# Patient Record
Sex: FEMALE | Race: WHITE | NOT HISPANIC OR LATINO | Employment: FULL TIME | URBAN - METROPOLITAN AREA
[De-identification: names, ages, dates, MRNs, and addresses within clinical notes are randomized per-mention and may not be internally consistent; named-entity substitution may affect disease eponyms.]

---

## 2023-08-21 ENCOUNTER — OFFICE VISIT (OUTPATIENT)
Dept: PRIMARY CARE | Facility: CLINIC | Age: 38
End: 2023-08-21
Payer: MEDICARE

## 2023-08-21 VITALS
RESPIRATION RATE: 16 BRPM | HEIGHT: 62 IN | DIASTOLIC BLOOD PRESSURE: 78 MMHG | BODY MASS INDEX: 23 KG/M2 | TEMPERATURE: 98 F | WEIGHT: 125 LBS | HEART RATE: 82 BPM | OXYGEN SATURATION: 97 % | SYSTOLIC BLOOD PRESSURE: 114 MMHG

## 2023-08-21 DIAGNOSIS — R20.2 NUMBNESS AND TINGLING: Primary | ICD-10-CM

## 2023-08-21 DIAGNOSIS — R20.0 NUMBNESS AND TINGLING: Primary | ICD-10-CM

## 2023-08-21 DIAGNOSIS — G62.89 OTHER POLYNEUROPATHY: ICD-10-CM

## 2023-08-21 PROBLEM — F90.9 ADHD (ATTENTION DEFICIT HYPERACTIVITY DISORDER): Status: ACTIVE | Noted: 2023-08-21

## 2023-08-21 PROBLEM — A60.00 GENITAL HERPES: Status: ACTIVE | Noted: 2023-08-21

## 2023-08-21 PROBLEM — F41.9 ANXIETY: Status: ACTIVE | Noted: 2023-08-21

## 2023-08-21 PROBLEM — F32.A DEPRESSION: Status: ACTIVE | Noted: 2023-08-21

## 2023-08-21 PROBLEM — G62.9 PERIPHERAL NEUROPATHY: Status: ACTIVE | Noted: 2023-08-21

## 2023-08-21 PROBLEM — Q20.3: Status: ACTIVE | Noted: 2023-08-21

## 2023-08-21 PROCEDURE — 1036F TOBACCO NON-USER: CPT | Performed by: FAMILY MEDICINE

## 2023-08-21 PROCEDURE — 99203 OFFICE O/P NEW LOW 30 MIN: CPT | Performed by: FAMILY MEDICINE

## 2023-08-21 RX ORDER — ESCITALOPRAM OXALATE 10 MG/1
TABLET, FILM COATED ORAL EVERY 24 HOURS
COMMUNITY
Start: 2022-06-01 | End: 2024-06-05 | Stop reason: SDUPTHER

## 2023-08-21 RX ORDER — ESCITALOPRAM OXALATE 20 MG/1
TABLET, FILM COATED ORAL EVERY 24 HOURS
COMMUNITY
Start: 2022-02-21 | End: 2024-02-22 | Stop reason: SDUPTHER

## 2023-08-21 RX ORDER — VALACYCLOVIR HYDROCHLORIDE 500 MG/1
500 TABLET, FILM COATED ORAL DAILY
COMMUNITY
End: 2023-10-02 | Stop reason: SDUPTHER

## 2023-08-21 RX ORDER — IBUPROFEN 100 MG/5ML
SUSPENSION, ORAL (FINAL DOSE FORM) ORAL
COMMUNITY

## 2023-08-21 RX ORDER — METHYLPHENIDATE HYDROCHLORIDE 18 MG/1
18 TABLET ORAL
COMMUNITY
End: 2023-10-26 | Stop reason: SDUPTHER

## 2023-08-21 RX ORDER — BUPROPION HYDROCHLORIDE 150 MG/1
150 TABLET ORAL
COMMUNITY
End: 2023-11-14 | Stop reason: SDUPTHER

## 2023-08-21 RX ORDER — VITAMIN E (DL,TOCOPHERYL ACET) 180 MG
CAPSULE ORAL
COMMUNITY

## 2023-08-21 RX ORDER — GLUCOSAMINE SULFATE 500 MG
TABLET ORAL EVERY 24 HOURS
COMMUNITY
Start: 2021-09-28 | End: 2024-06-05 | Stop reason: ALTCHOICE

## 2023-08-21 RX ORDER — FERROUS SULFATE 325(65) MG
TABLET ORAL
COMMUNITY
Start: 2021-09-28

## 2023-08-21 RX ORDER — ZINC GLUCONATE 50 MG
TABLET ORAL
COMMUNITY
End: 2024-06-05 | Stop reason: ALTCHOICE

## 2023-08-21 ASSESSMENT — ENCOUNTER SYMPTOMS: NUMBNESS: 1

## 2023-08-21 NOTE — PROGRESS NOTES
"Subjective   Patient ID: Angelica Matson is a 38 y.o. female who presents for Numbness (Establish care - hands and feet go numb on random occasions. This has been going on for about 4 years and she has not received a clear definitive diagnosis. ).    For several years she has numbness in the let forearm. It can also be in the right.  It will be cold & tingly.  She could be out walking/running.  It also may happen to her lower extremities.  Just the feet.  It doesn't matter if she is moving around or not.  She suddenly loses the ability to hold weights at the gym.  She has numbness and so she suddenly feels weak.    She has a pcp who she has been seeing and has had a lot of labs done to check this.      She states that she had thyroid tests done, lipids, cmp, and cbc and all was normal.      Neuropathy           Review of Systems   Neurological:  Positive for numbness.       Objective   /78   Pulse 82   Temp 36.7 °C (98 °F)   Resp 16   Ht 1.575 m (5' 2\")   Wt 56.7 kg (125 lb)   SpO2 97%   BMI 22.86 kg/m²     Physical Exam  Constitutional:       Appearance: Normal appearance.   HENT:      Head: Normocephalic and atraumatic.      Mouth/Throat:      Mouth: Mucous membranes are dry.   Cardiovascular:      Rate and Rhythm: Normal rate.   Pulmonary:      Effort: Pulmonary effort is normal. No respiratory distress.   Musculoskeletal:         General: No swelling, tenderness or signs of injury.      Cervical back: Normal range of motion. No rigidity.   Skin:     General: Skin is warm and dry.      Capillary Refill: Capillary refill takes less than 2 seconds.   Neurological:      General: No focal deficit present.      Mental Status: She is alert and oriented to person, place, and time.      Cranial Nerves: No cranial nerve deficit.      Motor: No weakness.      Coordination: Coordination normal.      Gait: Gait normal.      Deep Tendon Reflexes: Reflexes normal.         Assessment/Plan   Diagnoses and all orders " for this visit:  Numbness and tingling  -     EMG & nerve conduction; Future  -     MR brain w and wo IV contrast; Future  -     Referral to Neurology; Future  Other polyneuropathy  -     Referral to Neurology; Future

## 2023-08-21 NOTE — PATIENT INSTRUCTIONS
Welcome to our practice!    I would advise you to please decide on a pcp for your medical care going forward    Today we have evaluated your neuropathy and I have ordered emg/ncs and brain mri.     I have also referred you to neurologist.      Follow up when results received.

## 2023-08-31 DIAGNOSIS — Z87.74 H/O SENNING PROCEDURE: Primary | ICD-10-CM

## 2023-08-31 PROBLEM — I07.1 TRICUSPID VALVE REGURGITATION: Status: ACTIVE | Noted: 2023-08-31

## 2023-08-31 PROBLEM — Q20.3 TRANSPOSITION OF GREAT ARTERIES (HHS-HCC): Status: ACTIVE | Noted: 2023-08-31

## 2023-08-31 PROBLEM — I10 HTN (HYPERTENSION): Status: ACTIVE | Noted: 2023-08-31

## 2023-08-31 PROBLEM — I35.1 MILD AORTIC INSUFFICIENCY: Status: ACTIVE | Noted: 2023-08-31

## 2023-08-31 PROBLEM — Q23.1: Status: ACTIVE | Noted: 2023-08-31

## 2023-10-02 DIAGNOSIS — A60.00 GENITAL HERPES SIMPLEX, UNSPECIFIED SITE: ICD-10-CM

## 2023-10-08 RX ORDER — VALACYCLOVIR HYDROCHLORIDE 500 MG/1
500 TABLET, FILM COATED ORAL DAILY
Qty: 90 TABLET | Refills: 3 | Status: SHIPPED | OUTPATIENT
Start: 2023-10-08

## 2023-10-25 NOTE — PROGRESS NOTES
Patient Name: Angelica Matson   Provider: Juwan Jsoe MD  : 1985  Date of Service: 2023  Referring: No ref. provider found    DIAGNOSES:     1.DTGA  - Atrial switch (Senning) age 10 mos ([facility])  - TTE 23  2.Tricuspid Valve Regurgitation (Systemic AV valve)  - TTE 12/15/2020 Moderate to severe TR 2-3+  - TTE 2021 Mild TR  - TTE 10/18/2022 Mild TR  - TTE 23  3. Aortic Insufficiency  - TTE 2021 mild  - TTE 10/18/2022 mild  - TTE 23    INTERVAL HISTORY:     Dear  No ref. provider found    I saw Angelica Matson today in the [facility], [facility] and [facility] and Vascular [place] at [facility] multispecialty clinic.  She is a 39 yo with the above diagnoses.  Last seen by me 10/18/22.  At that visit she had no complaints and was an active runner.  Her echo showed preserved systemic RV function and mild TR (systemic AV valve).  She is on no caardiac meds.    Today she reports:    ROS:   General: no fatigue, no fever, no weight loss, no excessive sweating, no decreased appetite  HEENT: no facial swelling, no hoarseness, no eye redness, no eye lid swelling  Cardiac: no fainting, no cyanosis, no chest pain, no palpitations  Respiratory: no shortness of breath, no coughing blood, no noisy breathing, no wheezing, no cough  GI: No abdomen pain, no constipation, no diarrhea, no vomiting  Musculoskeletal: no extremity swelling  Skin: no paleness, no rash, no yellow skin  Hematologic: no easy bruising, no easy bleeding  Neurologic: no seizures, no weakness    All systems negative unless otherwise stated.    CURRENT MEDS:    Current Outpatient Medications:     ascorbic acid (Vitamin C) 1,000 mg tablet, Take by mouth., Disp: , Rfl:     buPROPion XL (Wellbutrin XL) 150 mg 24 hr tablet, Take 1 tablet (150 mg) by mouth once daily in the morning. Take before meals., Disp: , Rfl:     ferrous  sulfate 325 (65 Fe) MG tablet, Take by mouth., Disp: , Rfl:     glucosam/chondro/herb 149/hyal (GLUCOS CHOND CPLX ADVANCED ORAL), once every 24 hours., Disp: , Rfl:     levonorgestrel (MIRENA UTRN), as directed Intrauterine, Disp: , Rfl:     Lexapro 10 mg tablet, once every 24 hours., Disp: , Rfl:     Lexapro 20 mg tablet, once every 24 hours., Disp: , Rfl:     lysine 1,000 mg tablet, once every 24 hours., Disp: , Rfl:     methylphenidate CR (Concerta) 18 mg daily tablet, Take 1 tablet (18 mg) by mouth once daily in the morning. Take before meals., Disp: , Rfl:     multivit-min-folic acid-biotin (Hair,Skin and Nails,FA-biotin,) 133.3 mcg- 1,666.7 mcg capsule, Take by mouth., Disp: , Rfl:     valACYclovir (Valtrex) 500 mg tablet, Take 1 tablet (500 mg) by mouth once daily., Disp: 90 tablet, Rfl: 3    zinc gluconate 50 mg tablet, Take by mouth., Disp: , Rfl:     PHYSICAL EXAM:  There were no vitals taken for this visit.  There is no height or weight on file to calculate BMI.    General: Well appearing and in no distress  HEENT: Moist mucous membranes  Neck: Supple, JVP normal, Carotid upstrokes brisk bilaterally and without bruits  Respiratory: Clear to ausculation bilaterally; no wheezing/rales/rhonchi; respirations non-labored  Cardiovascular: RRR,normal PMI, normal S1 and S2. No S3 or S4. No murmurs or rubs.   Gastrointestinal: soft, non-tender, no organomegaly, no abnormal aortic pulsation  Extremities: warm and well perfused with no cyanosis, clubbing, or edema  Neurologic: awake/alert, no focal deficits    DATA:    TTE 11/1/23 personally reviewed by in clinic today:    Echo 10/18/2022 images reviewed and interpreted by Juwan Jose MD  D TGA s/p Senning Procedure  IVC is non dilated and collapses with inspiration  Mild AI  Mild TR  No MR  Preserved Systemic RV function  No baffle obstruction      TTE 9/28/2021:Images reviewed and interpreted by Juwan Jose MD   Aorta anterior and to the right of MPA- which  "confirms d-TGA  Phasic flow in atrial baffle   Small and D shaped LV  RV hypertrophied with preserved function  Mild TR  Trivial AI    WBC   Date Value Ref Range Status   07/31/2023 3.9 (L) 4.5 - 11.0 K/UL Final     Hemoglobin   Date Value Ref Range Status   07/31/2023 16.3 (H) 12.0 - 15.0 GM/DL Final     MCV   Date Value Ref Range Status   07/31/2023 94.7 80 - 100 FL Final     Creatinine   Date Value Ref Range Status   07/31/2023 0.8 0.4 - 1.6 MG/DL Final   01/31/2022 0.7 0.4 - 1.6 MG/DL Final       No components found for: \"MAGNESIUM\"  Total Protein   Date Value Ref Range Status   07/31/2023 7.2 5.9 - 7.9 G/DL Final     Albumin   Date Value Ref Range Status   07/31/2023 4.8 3.5 - 5.0 GM/DL Final     ALT (SGPT)   Date Value Ref Range Status   07/31/2023 38 5 - 40 U/L Final     AST   Date Value Ref Range Status   07/31/2023 35 5 - 40 U/L Final     No results found for: \"INR\", \"APTT\"      ASSESSMENT & PLAN:      39 y/o d-TGA s/p atrial switch doing well.     1. DTGA + H/O Senning procedure  s/p atrial switch operation (Senning)    Echo today shows:    F/u echo (annual) one year to assess systemic RV function, baffle, and AV valve regurgitation    SBE prophylaxis NOT indicated at this time    2. Tricuspid Valve Regurgitation    TTE today shows:    Will repeat Echo in one year for surveillance on severity    3. Aortic Insufficiency    TTE today shows:     Follow up 1 year with echo    4. HTN  SPB 130s to 150s at each visit over the last 2 years  BP log with SBPs ranging 115-130s    Now on Concerta for ADHD - this may potentiate HTN. Given that she has a systemic RV, excellent BP control is paramount to prevent slow RV reemodeling and ultimately decline in performance.   Will have patient continue to log BPs daily. If now hypertensive, need to consider other ADHD options or start BP med(s)    Thank you for allowing me to participate in the care of this patient.  Please don't hesitate to contact us with any questions or " concerns.    Sincerely,    Juwan Jose MD, MSc  Director, Adult Congenital Heart Disease Program  [facility] Heart and Vascular [place]

## 2023-10-26 DIAGNOSIS — F90.9 ATTENTION DEFICIT HYPERACTIVITY DISORDER (ADHD), UNSPECIFIED ADHD TYPE: ICD-10-CM

## 2023-10-27 RX ORDER — METHYLPHENIDATE HYDROCHLORIDE 18 MG/1
18 TABLET ORAL
Qty: 30 TABLET | Refills: 0 | Status: SHIPPED | OUTPATIENT
Start: 2023-10-27 | End: 2023-11-01

## 2023-11-01 ENCOUNTER — APPOINTMENT (OUTPATIENT)
Dept: PEDIATRIC CARDIOLOGY | Facility: HOSPITAL | Age: 38
End: 2023-11-01
Payer: MEDICARE

## 2023-11-01 DIAGNOSIS — F41.9 ANXIETY: ICD-10-CM

## 2023-11-01 RX ORDER — METHYLPHENIDATE HYDROCHLORIDE 18 MG/1
18 TABLET ORAL EVERY MORNING
COMMUNITY
End: 2023-11-01

## 2023-11-07 RX ORDER — METHYLPHENIDATE HYDROCHLORIDE 18 MG/1
18 TABLET ORAL EVERY MORNING
Qty: 90 TABLET | Refills: 0 | Status: SHIPPED | OUTPATIENT
Start: 2023-11-07 | End: 2023-11-14 | Stop reason: SDUPTHER

## 2023-11-08 NOTE — PROGRESS NOTES
Patient Name: Angelica Matson   Provider: Juwan Jose MD  : 1985  Date of Service: 11/15/2023  Referring: No ref. provider found    DIAGNOSES:     1.DTGA  - Atrial switch (Senning) age 10 mos ([facility])  - TTE 23  2.Tricuspid Valve Regurgitation (Systemic AV valve)  - TTE 12/15/2020 Moderate to severe TR 2-3+  - TTE 2021 Mild TR  - TTE 10/18/2022 Mild TR  - TTE 23  3. Aortic Insufficiency  - TTE 2021 mild  - TTE 10/18/2022 mild  - TTE 23    INTERVAL HISTORY:     Dear  No ref. provider found    I saw Angelica Matson today in the [facility], [facility] and [facility] Heart and Vascular [place] at [facility] multispecialty clinic.  She is a 39 yo with the above diagnoses.  Last seen by me 10/18/22.  At that visit she had no complaints and was an active runner.  Her echo showed preserved systemic RV function and mild TR (systemic AV valve).  She is on no caardiac meds.    Today she reports:    ROS:   General: no fatigue, no fever, no weight loss, no excessive sweating, no decreased appetite  HEENT: no facial swelling, no hoarseness, no eye redness, no eye lid swelling  Cardiac: no fainting, no cyanosis, no chest pain, no palpitations  Respiratory: no shortness of breath, no coughing blood, no noisy breathing, no wheezing, no cough  GI: No abdomen pain, no constipation, no diarrhea, no vomiting  Musculoskeletal: no extremity swelling  Skin: no paleness, no rash, no yellow skin  Hematologic: no easy bruising, no easy bleeding  Neurologic: no seizures, no weakness    All systems negative unless otherwise stated.    CURRENT MEDS:    Current Outpatient Medications:     ascorbic acid (Vitamin C) 1,000 mg tablet, Take by mouth., Disp: , Rfl:     buPROPion XL (Wellbutrin XL) 150 mg 24 hr tablet, Take 1 tablet (150 mg) by mouth once daily in the morning. Take before meals., Disp: , Rfl:     ferrous  sulfate 325 (65 Fe) MG tablet, Take by mouth., Disp: , Rfl:     glucosam/chondro/herb 149/hyal (GLUCOS CHOND CPLX ADVANCED ORAL), once every 24 hours., Disp: , Rfl:     levonorgestrel (MIRENA UTRN), as directed Intrauterine, Disp: , Rfl:     Lexapro 10 mg tablet, once every 24 hours., Disp: , Rfl:     Lexapro 20 mg tablet, once every 24 hours., Disp: , Rfl:     lysine 1,000 mg tablet, once every 24 hours., Disp: , Rfl:     methylphenidate CR (Concerta) 18 mg daily tablet, Take 1 tablet (18 mg) by mouth once daily in the morning. Do not crush, chew, or split., Disp: 90 tablet, Rfl: 0    multivit-min-folic acid-biotin (Hair,Skin and Nails,FA-biotin,) 133.3 mcg- 1,666.7 mcg capsule, Take by mouth., Disp: , Rfl:     valACYclovir (Valtrex) 500 mg tablet, Take 1 tablet (500 mg) by mouth once daily., Disp: 90 tablet, Rfl: 3    zinc gluconate 50 mg tablet, Take by mouth., Disp: , Rfl:     PHYSICAL EXAM:  There were no vitals taken for this visit.  There is no height or weight on file to calculate BMI.    General: Well appearing and in no distress  HEENT: Moist mucous membranes  Neck: Supple, JVP normal, Carotid upstrokes brisk bilaterally and without bruits  Respiratory: Clear to ausculation bilaterally; no wheezing/rales/rhonchi; respirations non-labored  Cardiovascular: RRR,normal PMI, normal S1 and S2. No S3 or S4. No murmurs or rubs.   Gastrointestinal: soft, non-tender, no organomegaly, no abnormal aortic pulsation  Extremities: warm and well perfused with no cyanosis, clubbing, or edema  Neurologic: awake/alert, no focal deficits    DATA:    TTE 11/1/23 personally reviewed by in clinic today:    Echo 10/18/2022 images reviewed and interpreted by Juwan Jose MD  D TGA s/p Senning Procedure  IVC is non dilated and collapses with inspiration  Mild AI  Mild TR  No MR  Preserved Systemic RV function  No baffle obstruction      TTE 9/28/2021:Images reviewed and interpreted by Juwan Jose MD   Aorta anterior and to the  "right of MPA- which confirms d-TGA  Phasic flow in atrial baffle   Small and D shaped LV  RV hypertrophied with preserved function  Mild TR  Trivial AI    WBC   Date Value Ref Range Status   07/31/2023 3.9 (L) 4.5 - 11.0 K/UL Final     Hemoglobin   Date Value Ref Range Status   07/31/2023 16.3 (H) 12.0 - 15.0 GM/DL Final     MCV   Date Value Ref Range Status   07/31/2023 94.7 80 - 100 FL Final     Creatinine   Date Value Ref Range Status   07/31/2023 0.8 0.4 - 1.6 MG/DL Final   01/31/2022 0.7 0.4 - 1.6 MG/DL Final       No components found for: \"MAGNESIUM\"  Total Protein   Date Value Ref Range Status   07/31/2023 7.2 5.9 - 7.9 G/DL Final     Albumin   Date Value Ref Range Status   07/31/2023 4.8 3.5 - 5.0 GM/DL Final     ALT (SGPT)   Date Value Ref Range Status   07/31/2023 38 5 - 40 U/L Final     AST   Date Value Ref Range Status   07/31/2023 35 5 - 40 U/L Final     No results found for: \"INR\", \"APTT\"      ASSESSMENT & PLAN:      39 y/o d-TGA s/p atrial switch doing well.     1. DTGA + H/O Senning procedure  s/p atrial switch operation (Senning)    Echo today shows:    F/u echo (annual) one year to assess systemic RV function, baffle, and AV valve regurgitation    SBE prophylaxis NOT indicated at this time    2. Tricuspid Valve Regurgitation    TTE today shows:    Will repeat Echo in one year for surveillance on severity    3. Aortic Insufficiency    TTE today shows:     Follow up 1 year with echo    4. HTN  SPB 130s to 150s at each visit over the last 2 years  BP log with SBPs ranging 115-130s    Now on Concerta for ADHD - this may potentiate HTN. Given that she has a systemic RV, excellent BP control is paramount to prevent slow RV reemodeling and ultimately decline in performance.   Will have patient continue to log BPs daily. If now hypertensive, need to consider other ADHD options or start BP med(s)    Thank you for allowing me to participate in the care of this patient.  Please don't hesitate to contact us " with any questions or concerns.    Sincerely,    Juwan Jose MD, MSc  Director, Adult Congenital Heart Disease Program   [place] [facility] Heart and Vascular [place]

## 2023-11-15 ENCOUNTER — APPOINTMENT (OUTPATIENT)
Dept: PEDIATRIC CARDIOLOGY | Facility: HOSPITAL | Age: 38
End: 2023-11-15
Payer: MEDICARE

## 2023-11-15 RX ORDER — METHYLPHENIDATE HYDROCHLORIDE 18 MG/1
18 TABLET ORAL EVERY MORNING
Qty: 90 TABLET | Refills: 0 | Status: SHIPPED | OUTPATIENT
Start: 2023-11-15 | End: 2024-02-22 | Stop reason: SDUPTHER

## 2023-11-15 RX ORDER — BUPROPION HYDROCHLORIDE 150 MG/1
150 TABLET ORAL
Qty: 90 TABLET | Refills: 3 | Status: SHIPPED | OUTPATIENT
Start: 2023-11-15

## 2023-11-20 NOTE — PROGRESS NOTES
Patient Name: Angelica Matson   Provider: Juwan Jose MD  : 1985  Date of Service: 2023  Referring: No ref. provider found    DIAGNOSES:     1.DTGA  - Atrial switch (Senning) age 10 mos ([facility])  - TTE 23  2.Tricuspid Valve Regurgitation (Systemic AV valve)  - TTE 12/15/2020 Moderate to severe TR 2-3+  - TTE 2021 Mild TR  - TTE 10/18/2022 Mild TR  - TTE 23  3. Aortic Insufficiency  - TTE 2021 mild  - TTE 10/18/2022 mild  - TTE 23    INTERVAL HISTORY:     Dear  No ref. provider found    I saw Angelica Matson today in the [facility] with Congenital Heart Disease, [facility] and [facility] Heart and Vascular [place] at [facility] multispecialty clinic.  She is a 39 yo with the above diagnoses.  Last seen by me 10/18/22.  At that visit she had no complaints and was an active runner.  Her echo showed preserved systemic RV function and mild TR (systemic AV valve).  She is on no cardiac meds.    Today she reports:    ROS:   General: no fatigue, no fever, no weight loss, no excessive sweating, no decreased appetite  HEENT: no facial swelling, no hoarseness, no eye redness, no eye lid swelling  Cardiac: no fainting, no cyanosis, no chest pain, no palpitations  Respiratory: no shortness of breath, no coughing blood, no noisy breathing, no wheezing, no cough  GI: No abdomen pain, no constipation, no diarrhea, no vomiting  Musculoskeletal: no extremity swelling  Skin: no paleness, no rash, no yellow skin  Hematologic: no easy bruising, no easy bleeding  Neurologic: no seizures, no weakness    All systems negative unless otherwise stated.    CURRENT MEDS:    Current Outpatient Medications:     ascorbic acid (Vitamin C) 1,000 mg tablet, Take by mouth., Disp: , Rfl:     buPROPion XL (Wellbutrin XL) 150 mg 24 hr tablet, Take 1 tablet (150 mg) by mouth once daily in the morning. Take before meals., Disp: 90 tablet, Rfl: 3     ferrous sulfate 325 (65 Fe) MG tablet, Take by mouth., Disp: , Rfl:     glucosam/chondro/herb 149/hyal (GLUCOS CHOND CPLX ADVANCED ORAL), once every 24 hours., Disp: , Rfl:     levonorgestrel (MIRENA UTRN), as directed Intrauterine, Disp: , Rfl:     Lexapro 10 mg tablet, once every 24 hours., Disp: , Rfl:     Lexapro 20 mg tablet, once every 24 hours., Disp: , Rfl:     lysine 1,000 mg tablet, once every 24 hours., Disp: , Rfl:     methylphenidate ER (Concerta) 18 mg daily tablet, Take 1 tablet (18 mg) by mouth once daily in the morning. Do not crush, chew, or split., Disp: 90 tablet, Rfl: 0    multivit-min-folic acid-biotin (Hair,Skin and Nails,FA-biotin,) 133.3 mcg- 1,666.7 mcg capsule, Take by mouth., Disp: , Rfl:     valACYclovir (Valtrex) 500 mg tablet, Take 1 tablet (500 mg) by mouth once daily., Disp: 90 tablet, Rfl: 3    zinc gluconate 50 mg tablet, Take by mouth., Disp: , Rfl:     PHYSICAL EXAM:  There were no vitals taken for this visit.  There is no height or weight on file to calculate BMI.    General: Well appearing and in no distress  HEENT: Moist mucous membranes  Neck: Supple, JVP normal, Carotid upstrokes brisk bilaterally and without bruits  Respiratory: Clear to ausculation bilaterally; no wheezing/rales/rhonchi; respirations non-labored  Cardiovascular: RRR,normal PMI, normal S1 and S2. No S3 or S4. No murmurs or rubs.   Gastrointestinal: soft, non-tender, no organomegaly, no abnormal aortic pulsation  Extremities: warm and well perfused with no cyanosis, clubbing, or edema  Neurologic: awake/alert, no focal deficits    DATA:    TTE 12/6/23 personally reviewed by in clinic today:    Echo 10/18/2022 images reviewed and interpreted by Juwan Jose MD  D TGA s/p Senning Procedure  IVC is non dilated and collapses with inspiration  Mild AI  Mild TR  No MR  Preserved Systemic RV function  No baffle obstruction      TTE 9/28/2021:Images reviewed and interpreted by Juwan Jose MD   Aorta anterior  "and to the right of MPA- which confirms d-TGA  Phasic flow in atrial baffle   Small and D shaped LV  RV hypertrophied with preserved function  Mild TR  Trivial AI    WBC   Date Value Ref Range Status   07/31/2023 3.9 (L) 4.5 - 11.0 K/UL Final     Hemoglobin   Date Value Ref Range Status   07/31/2023 16.3 (H) 12.0 - 15.0 GM/DL Final     MCV   Date Value Ref Range Status   07/31/2023 94.7 80 - 100 FL Final     Creatinine   Date Value Ref Range Status   07/31/2023 0.8 0.4 - 1.6 MG/DL Final   01/31/2022 0.7 0.4 - 1.6 MG/DL Final       No components found for: \"MAGNESIUM\"  Total Protein   Date Value Ref Range Status   07/31/2023 7.2 5.9 - 7.9 G/DL Final     Albumin   Date Value Ref Range Status   07/31/2023 4.8 3.5 - 5.0 GM/DL Final     ALT (SGPT)   Date Value Ref Range Status   07/31/2023 38 5 - 40 U/L Final     AST   Date Value Ref Range Status   07/31/2023 35 5 - 40 U/L Final     No results found for: \"INR\", \"APTT\"      ASSESSMENT & PLAN:      39 y/o d-TGA s/p atrial switch doing well.     1. DTGA + H/O Senning procedure  s/p atrial switch operation (Senning)    Echo today shows:    F/u echo (annual) one year to assess systemic RV function, baffle, and AV valve regurgitation    SBE prophylaxis NOT indicated at this time    2. Tricuspid Valve Regurgitation    TTE today shows:    Will repeat Echo in one year for surveillance on severity    3. Aortic Insufficiency    TTE today shows:     Follow up 1 year with echo    4. HTN  SPB 130s to 150s at each visit over the last 2 years  BP log with SBPs ranging 115-130s    Now on Concerta for ADHD - this may potentiate HTN. Given that she has a systemic RV, excellent BP control is paramount to prevent slow RV remodeling and ultimately decline in performance.   Will have patient continue to log BPs daily. If now hypertensive, need to consider other ADHD options or start BP med(s)    Thank you for allowing me to participate in the care of this patient.  Please don't hesitate to " contact us with any questions or concerns.    Sincerely,    Juwan Jose MD, MSc  Director, Adult Congenital Heart Disease Program  [facility] [place] [facility]

## 2023-12-06 ENCOUNTER — APPOINTMENT (OUTPATIENT)
Dept: PEDIATRIC CARDIOLOGY | Facility: HOSPITAL | Age: 38
End: 2023-12-06
Payer: MEDICARE

## 2024-02-18 ENCOUNTER — PATIENT MESSAGE (OUTPATIENT)
Dept: PRIMARY CARE | Facility: CLINIC | Age: 39
End: 2024-02-18
Payer: MEDICARE

## 2024-02-18 DIAGNOSIS — F41.9 ANXIETY: ICD-10-CM

## 2024-02-18 DIAGNOSIS — F90.0 ATTENTION DEFICIT HYPERACTIVITY DISORDER (ADHD), PREDOMINANTLY INATTENTIVE TYPE: Primary | ICD-10-CM

## 2024-02-22 DIAGNOSIS — F41.9 ANXIETY: ICD-10-CM

## 2024-02-22 RX ORDER — METHYLPHENIDATE HYDROCHLORIDE 18 MG/1
18 TABLET ORAL EVERY MORNING
Qty: 90 TABLET | Refills: 0 | Status: SHIPPED | OUTPATIENT
Start: 2024-02-22 | End: 2024-02-22 | Stop reason: SDUPTHER

## 2024-02-22 RX ORDER — ESCITALOPRAM OXALATE 10 MG/1
10 TABLET ORAL DAILY
Qty: 90 TABLET | Refills: 3 | Status: SHIPPED | OUTPATIENT
Start: 2024-02-22 | End: 2025-02-16

## 2024-02-22 RX ORDER — METHYLPHENIDATE HYDROCHLORIDE 18 MG/1
18 TABLET ORAL EVERY MORNING
Qty: 90 TABLET | Refills: 0 | Status: SHIPPED | OUTPATIENT
Start: 2024-02-22 | End: 2024-06-03 | Stop reason: SDUPTHER

## 2024-02-22 RX ORDER — ESCITALOPRAM OXALATE 20 MG/1
20 TABLET, FILM COATED ORAL EVERY 24 HOURS
Qty: 90 TABLET | Refills: 3 | Status: SHIPPED | OUTPATIENT
Start: 2024-02-22 | End: 2025-02-21

## 2024-05-28 NOTE — PROGRESS NOTES
Patient Name: Angelica Matson   Provider: Juwan Jose MD  : 1985  Date of Service: 2024      DIAGNOSES:     1. DTGA  - Atrial switch (Senning) 1988 ([facility])  - Preserved systemic RV function  2. Tricuspid Valve Regurgitation (Systemic AV valve)  - TTE 12/15/2020 Moderate to severe TR 2-3+  - TTE 2021 Mild TR  - TTE 10/18/2022 Mild TR  - TTE 24 Mild TR  3. Aortic Insufficiency  - TTE 2021 mild  - TTE 10/18/2022 mild  - TTE 24 trivial      INTERVAL HISTORY:     I saw Angelica Matson today in the [facility] with Congenital Heart Disease,  [facility] and [facility] [place] at [facility].    She is a 38 year old female with DTGA s/p atrial switch age 10 months in [place], [place]. She was followed through childhood at [facility], (physician unknown). She was seen in  for cardiac clearance prior to breast reduction surgery.  She was last seen by me 22.     She denies cardiac symptoms chest pain, SOB, palpitations, syncope. She runs, does yoga, salsa dancing without any limitations or symptoms.  No concerns today.      ROS:   General: no fatigue, no fever, no weight loss, no excessive sweating, no decreased appetite  HEENT: no facial swelling, no hoarseness, no eye redness, no eye lid swelling  Cardiac: no fainting, no cyanosis, no chest pain, no palpitations  Respiratory: no shortness of breath, no coughing blood, no noisy breathing, no wheezing, no cough  GI: No abdomen pain, no constipation, no diarrhea, no vomiting  Musculoskeletal: no extremity swelling  Skin: no paleness, no rash, no yellow skin  Hematologic: no easy bruising, no easy bleeding  Neurologic: no seizures, no weakness    All systems negative unless otherwise stated.    CURRENT MEDS:    Current Outpatient Medications:     ascorbic acid (Vitamin C) 1,000 mg tablet, Take by mouth., Disp: , Rfl:     buPROPion XL (Wellbutrin XL) 150 mg 24 hr  "tablet, Take 1 tablet (150 mg) by mouth once daily in the morning. Take before meals., Disp: 90 tablet, Rfl: 3    escitalopram (Lexapro) 10 mg tablet, Take 1 tablet (10 mg) by mouth once daily., Disp: 90 tablet, Rfl: 3    ferrous sulfate 325 (65 Fe) MG tablet, Take by mouth., Disp: , Rfl:     glucosam/chondro/herb 149/hyal (GLUCOS CHOND CPLX ADVANCED ORAL), once every 24 hours., Disp: , Rfl:     levonorgestrel (MIRENA UTRN), as directed Intrauterine, Disp: , Rfl:     Lexapro 20 mg tablet, Take 1 tablet (20 mg) by mouth once every 24 hours., Disp: 90 tablet, Rfl: 3    methylphenidate ER 18 mg extended release tablet, Take 1 tablet (18 mg) by mouth once daily in the morning. Do not crush, chew, or split., Disp: 90 tablet, Rfl: 0    multivit-min-folic acid-biotin (Hair,Skin and Nails,FA-biotin,) 133.3 mcg- 1,666.7 mcg capsule, Take by mouth., Disp: , Rfl:     ondansetron (Zofran) 4 mg tablet, TAKE ONE TABLET BY MOUTH EVERY 6 HOURS AS NEEDED, Disp: 120 tablet, Rfl: 0    valACYclovir (Valtrex) 500 mg tablet, Take 1 tablet (500 mg) by mouth once daily., Disp: 90 tablet, Rfl: 3    PHYSICAL EXAM:  /89 (BP Location: Left arm)   Pulse 73   Ht 1.579 m (5' 2.17\")   Wt 57.2 kg (126 lb 1.7 oz)   SpO2 98%   BMI 22.94 kg/m²   Body mass index is 22.94 kg/m².    General: Well appearing and in no distress  HEENT: Moist mucous membranes  Neck: Supple, JVP normal, Carotid upstrokes brisk bilaterally and without bruits  Respiratory: Clear to ausculation bilaterally; no wheezing/rales/rhonchi; respirations non-labored  Cardiovascular: RRR,normal PMI, normal S1 and S2. No S3 or S4. No murmurs or rubs.   Gastrointestinal: soft, non-tender, no organomegaly, no abnormal aortic pulsation  Extremities: warm and well perfused with no cyanosis, clubbing, or edema  Neurologic: awake/alert, no focal deficits    DATA:    TTE 6/5/24 personally reviewed by me in clinic today:  DTGA, atrial switch  D shaped LV   Dilated and hypertrophied RV " "with preserved function  Trivial AI  Mild TR  No baffle/pathway obstruction    ECG 6/5/24 NSR, RVH with repol    Echo 10/18/2022 images reviewed and interpreted by me  D TGA s/p Senning Procedure  IVC is non dilated and collapses with inspiration  Mild AI  Mild TR  No MR  Preserved Systemic RV function  No baffle obstruction        TTE 9/28/2021:Images reviewed and interpreted by me  Aorta anterior and to the right of MPA- which confirms d-TGA  Phasic flow in atrial baffle   Small and D shaped LV  RV hypertrophied with preserved function  Mild TR  Trivial AI        WBC   Date Value Ref Range Status   07/31/2023 3.9 (L) 4.5 - 11.0 K/UL Final     Hemoglobin   Date Value Ref Range Status   07/31/2023 16.3 (H) 12.0 - 15.0 GM/DL Final     MCV   Date Value Ref Range Status   07/31/2023 94.7 80 - 100 FL Final     Creatinine   Date Value Ref Range Status   07/31/2023 0.8 0.4 - 1.6 MG/DL Final   01/31/2022 0.7 0.4 - 1.6 MG/DL Final       No components found for: \"MAGNESIUM\"  Total Protein   Date Value Ref Range Status   07/31/2023 7.2 5.9 - 7.9 G/DL Final     Albumin   Date Value Ref Range Status   07/31/2023 4.8 3.5 - 5.0 GM/DL Final     ALT (SGPT)   Date Value Ref Range Status   07/31/2023 38 5 - 40 U/L Final     AST   Date Value Ref Range Status   07/31/2023 35 5 - 40 U/L Final     No results found for: \"INR\", \"APTT\"      ASSESSMENT & PLAN:      37 y/o d-TGA s/p atrial switch doing well.      1. DTGA + H/O Senning procedure  s/p atrial switch operation (Senning)  Echo today shows systemic RV hypertrophy with preserved function  LV function preserved  Atrial baffle/pathway with phasic flow with no evidence of baffle leak based on sats (high 90s)  Trivial AI, Mild TR     F/u echo one year to assess function, baffle, and AV valve regurgitation     SBE prophylaxis NOT indicated at this time     2. Tricuspid Valve Regurgitation  TTE today shows mild systemic AV (tricuspid) valve regurgitation, unchanged  Will repeat echo in " one year to assess for surveillance on severity        3. Aortic Insufficiency  TTE today shows trivial AI, unchanged    Follow up 1 year with echo     4. HTN  SPB 130s to 150s at each visit over the last 2 years  BP log at home with SBPs ranging 120s-130s     Now on Concerta for ADHD - this may potentiate HTN. Given that she has a systemic RV, excellent BP control is paramount to prevent slow RV remodeling and ultimately decline in performance.   Will have patient continue to log BPs daily.     Thank you for allowing me to participate in the care of this patient.  Please don't hesitate to contact us with any questions or concerns.    Sincerely,    Juwan Jose MD, MSc  Director, Adult Congenital Heart Disease Program  [facility] [place]

## 2024-06-03 DIAGNOSIS — F41.9 ANXIETY: ICD-10-CM

## 2024-06-04 RX ORDER — METHYLPHENIDATE HYDROCHLORIDE 18 MG/1
18 TABLET ORAL EVERY MORNING
Qty: 90 TABLET | Refills: 0 | Status: SHIPPED | OUTPATIENT
Start: 2024-06-04

## 2024-06-05 ENCOUNTER — HOSPITAL ENCOUNTER (OUTPATIENT)
Dept: PEDIATRIC CARDIOLOGY | Facility: HOSPITAL | Age: 39
Discharge: HOME | End: 2024-06-05
Payer: MEDICARE

## 2024-06-05 ENCOUNTER — OFFICE VISIT (OUTPATIENT)
Dept: PEDIATRIC CARDIOLOGY | Facility: HOSPITAL | Age: 39
End: 2024-06-05
Payer: MEDICARE

## 2024-06-05 VITALS
HEART RATE: 73 BPM | BODY MASS INDEX: 23.21 KG/M2 | DIASTOLIC BLOOD PRESSURE: 89 MMHG | SYSTOLIC BLOOD PRESSURE: 141 MMHG | HEIGHT: 62 IN | WEIGHT: 126.1 LBS | OXYGEN SATURATION: 98 %

## 2024-06-05 DIAGNOSIS — Z87.74 H/O SENNING PROCEDURE: ICD-10-CM

## 2024-06-05 DIAGNOSIS — Z87.74 H/O MUSTARD PROCEDURE: ICD-10-CM

## 2024-06-05 DIAGNOSIS — I10 HYPERTENSION, UNSPECIFIED TYPE: ICD-10-CM

## 2024-06-05 DIAGNOSIS — Q20.3 DISCORDANT VENTRICULOARTERIAL CONNECTION (HHS-HCC): ICD-10-CM

## 2024-06-05 DIAGNOSIS — Q20.3 TRANSPOSITION OF GREAT ARTERIES (HHS-HCC): Primary | ICD-10-CM

## 2024-06-05 LAB
ATRIAL RATE: 72 BPM
P AXIS: 98 DEGREES
P OFFSET: 186 MS
P ONSET: 143 MS
PR INTERVAL: 146 MS
Q ONSET: 216 MS
QRS COUNT: 12 BEATS
QRS DURATION: 104 MS
QT INTERVAL: 404 MS
QTC CALCULATION(BAZETT): 442 MS
QTC FREDERICIA: 429 MS
R AXIS: 222 DEGREES
T AXIS: 97 DEGREES
T OFFSET: 418 MS
VENTRICULAR RATE: 72 BPM

## 2024-06-05 PROCEDURE — 1036F TOBACCO NON-USER: CPT | Performed by: INTERNAL MEDICINE

## 2024-06-05 PROCEDURE — 93303 ECHO TRANSTHORACIC: CPT | Performed by: PEDIATRICS

## 2024-06-05 PROCEDURE — 99214 OFFICE O/P EST MOD 30 MIN: CPT | Performed by: INTERNAL MEDICINE

## 2024-06-05 PROCEDURE — 3079F DIAST BP 80-89 MM HG: CPT | Performed by: INTERNAL MEDICINE

## 2024-06-05 PROCEDURE — 93303 ECHO TRANSTHORACIC: CPT

## 2024-06-05 PROCEDURE — 3077F SYST BP >= 140 MM HG: CPT | Performed by: INTERNAL MEDICINE

## 2024-06-05 PROCEDURE — 93005 ELECTROCARDIOGRAM TRACING: CPT

## 2024-06-05 PROCEDURE — 93010 ELECTROCARDIOGRAM REPORT: CPT | Performed by: INTERNAL MEDICINE

## 2024-06-05 NOTE — PATIENT INSTRUCTIONS
Your echo today was reassuring - no change from 2022.  Overall function is excellent.  No medication changes - please continue to check blood pressure at home.  Next visit one year with an echo.    Follow up with: Juwan Jose MD    Date: 6/4/25   Time: Echo & EKG: 3:00  Appointment: 4:00   [facility] [place], [address]   Endocarditis prophylaxis:   You do not need antibiotics before dental cleanings and procedures. Please see the dentist every 6 months for a teeth cleaning.     [facility] program contact information:  [facility] line: [phone]  [facility] Coordinator: [phone] (scheduling)  After hours: call [phone] to page on-call pediatric cardiology fellow at [phone]  Email: [email or web address]  UserAppt Gloria  **If your pharmacy has any problems requesting refills from us for your cardiac medications, please contact us.

## 2024-06-05 NOTE — LETTER
2024     Jaylin Monsivais MD  [id number]  [facility], [address]  [facility] [address]    Patient: Angelica Matson   YOB: 1985   Date of Visit: 2024       Dear Dr. Jaylin Monsivais MD:    Thank you for referring Angelica Matson to me for evaluation. Below are my notes for this consultation.  If you have questions, please do not hesitate to call me. I look forward to following your patient along with you.       Sincerely,     Juwan Jose MD      CC: No Recipients  ______________________________________________________________________________________    Patient Name: Angelica Matson   Provider: Juwan Jose MD  : 1985  Date of Service: 2024      DIAGNOSES:     1. DTGA  - Atrial switch (Senning) 1988 ([facility])  - Preserved systemic RV function  2. Tricuspid Valve Regurgitation (Systemic AV valve)  - TTE 12/15/2020 Moderate to severe TR 2-3+  - TTE 2021 Mild TR  - TTE 10/18/2022 Mild TR  - TTE 24 Mild TR  3. Aortic Insufficiency  - TTE 2021 mild  - TTE 10/18/2022 mild  - TTE 24 trivial      INTERVAL HISTORY:     I saw Angelica Matson today in the [facility] Adults with Congenital Heart Disease, [facility] and  [facility] [place] at [facility].    She is a 38 year old female with DTGA s/p atrial switch age 10 months in [place], [place]. She was followed through childhood at [facility], (physician unknown). She was seen in 2012 for cardiac clearance prior to breast reduction surgery.  She was last seen by me 22.     She denies cardiac symptoms chest pain, SOB, palpitations, syncope. She runs, does yoga, salsa dancing without any limitations or symptoms.  No concerns today.      ROS:   General: no fatigue, no fever, no weight loss, no excessive sweating, no decreased appetite  HEENT: no facial swelling, no hoarseness, no eye redness, no eye lid swelling  Cardiac: no fainting, no  "cyanosis, no chest pain, no palpitations  Respiratory: no shortness of breath, no coughing blood, no noisy breathing, no wheezing, no cough  GI: No abdomen pain, no constipation, no diarrhea, no vomiting  Musculoskeletal: no extremity swelling  Skin: no paleness, no rash, no yellow skin  Hematologic: no easy bruising, no easy bleeding  Neurologic: no seizures, no weakness    All systems negative unless otherwise stated.    CURRENT MEDS:    Current Outpatient Medications:   •  ascorbic acid (Vitamin C) 1,000 mg tablet, Take by mouth., Disp: , Rfl:   •  buPROPion XL (Wellbutrin XL) 150 mg 24 hr tablet, Take 1 tablet (150 mg) by mouth once daily in the morning. Take before meals., Disp: 90 tablet, Rfl: 3  •  escitalopram (Lexapro) 10 mg tablet, Take 1 tablet (10 mg) by mouth once daily., Disp: 90 tablet, Rfl: 3  •  ferrous sulfate 325 (65 Fe) MG tablet, Take by mouth., Disp: , Rfl:   •  glucosam/chondro/herb 149/hyal (GLUCOS CHOND CPLX ADVANCED ORAL), once every 24 hours., Disp: , Rfl:   •  levonorgestrel (MIRENA UTRN), as directed Intrauterine, Disp: , Rfl:   •  Lexapro 20 mg tablet, Take 1 tablet (20 mg) by mouth once every 24 hours., Disp: 90 tablet, Rfl: 3  •  methylphenidate ER 18 mg extended release tablet, Take 1 tablet (18 mg) by mouth once daily in the morning. Do not crush, chew, or split., Disp: 90 tablet, Rfl: 0  •  multivit-min-folic acid-biotin (Hair,Skin and Nails,FA-biotin,) 133.3 mcg- 1,666.7 mcg capsule, Take by mouth., Disp: , Rfl:   •  ondansetron (Zofran) 4 mg tablet, TAKE ONE TABLET BY MOUTH EVERY 6 HOURS AS NEEDED, Disp: 120 tablet, Rfl: 0  •  valACYclovir (Valtrex) 500 mg tablet, Take 1 tablet (500 mg) by mouth once daily., Disp: 90 tablet, Rfl: 3    PHYSICAL EXAM:  /89 (BP Location: Left arm)   Pulse 73   Ht 1.579 m (5' 2.17\")   Wt 57.2 kg (126 lb 1.7 oz)   SpO2 98%   BMI 22.94 kg/m²   Body mass index is 22.94 kg/m².    General: Well appearing and in no distress  HEENT: Moist mucous " "membranes  Neck: Supple, JVP normal, Carotid upstrokes brisk bilaterally and without bruits  Respiratory: Clear to ausculation bilaterally; no wheezing/rales/rhonchi; respirations non-labored  Cardiovascular: RRR,normal PMI, normal S1 and S2. No S3 or S4. No murmurs or rubs.   Gastrointestinal: soft, non-tender, no organomegaly, no abnormal aortic pulsation  Extremities: warm and well perfused with no cyanosis, clubbing, or edema  Neurologic: awake/alert, no focal deficits    DATA:    TTE 6/5/24 personally reviewed by me in clinic today:  DTGA, atrial switch  D shaped LV   Dilated and hypertrophied RV with preserved function  Trivial AI  Mild TR  No baffle/pathway obstruction    ECG 6/5/24 NSR, RVH with repol    Echo 10/18/2022 images reviewed and interpreted by me  D TGA s/p Senning Procedure  IVC is non dilated and collapses with inspiration  Mild AI  Mild TR  No MR  Preserved Systemic RV function  No baffle obstruction        TTE 9/28/2021:Images reviewed and interpreted by me  Aorta anterior and to the right of MPA- which confirms d-TGA  Phasic flow in atrial baffle   Small and D shaped LV  RV hypertrophied with preserved function  Mild TR  Trivial AI        WBC   Date Value Ref Range Status   07/31/2023 3.9 (L) 4.5 - 11.0 K/UL Final     Hemoglobin   Date Value Ref Range Status   07/31/2023 16.3 (H) 12.0 - 15.0 GM/DL Final     MCV   Date Value Ref Range Status   07/31/2023 94.7 80 - 100 FL Final     Creatinine   Date Value Ref Range Status   07/31/2023 0.8 0.4 - 1.6 MG/DL Final   01/31/2022 0.7 0.4 - 1.6 MG/DL Final       No components found for: \"MAGNESIUM\"  Total Protein   Date Value Ref Range Status   07/31/2023 7.2 5.9 - 7.9 G/DL Final     Albumin   Date Value Ref Range Status   07/31/2023 4.8 3.5 - 5.0 GM/DL Final     ALT (SGPT)   Date Value Ref Range Status   07/31/2023 38 5 - 40 U/L Final     AST   Date Value Ref Range Status   07/31/2023 35 5 - 40 U/L Final     No results found for: \"INR\", " "\"APTT\"      ASSESSMENT & PLAN:      39 y/o d-TGA s/p atrial switch doing well.      1. DTGA + H/O Senning procedure  s/p atrial switch operation (Senning)  Echo today shows systemic RV hypertrophy with preserved function  LV function preserved  Atrial baffle/pathway with phasic flow with no evidence of baffle leak based on sats (high 90s)  Trivial AI, Mild TR     F/u echo one year to assess function, baffle, and AV valve regurgitation     SBE prophylaxis NOT indicated at this time     2. Tricuspid Valve Regurgitation  TTE today shows mild systemic AV (tricuspid) valve regurgitation, unchanged  Will repeat echo in one year to assess for surveillance on severity        3. Aortic Insufficiency  TTE today shows trivial AI, unchanged    Follow up 1 year with echo     4. HTN  SPB 130s to 150s at each visit over the last 2 years  BP log at home with SBPs ranging 120s-130s     Now on Concerta for ADHD - this may potentiate HTN. Given that she has a systemic RV, excellent BP control is paramount to prevent slow RV remodeling and ultimately decline in performance.   Will have patient continue to log BPs daily.     Thank you for allowing me to participate in the care of this patient.  Please don't hesitate to contact us with any questions or concerns.    Sincerely,    Juwan Jose MD, MSc  Director, Adult Congenital Heart Disease Program  [facility] [place]     "

## 2024-06-06 LAB
AORTIC VALVE PEAK VELOCITY: 1.52 M/S
AV PEAK GRADIENT: 9.2 MMHG
PULMONIC VALVE PEAK GRADIENT: 4.1 MMHG

## 2024-08-01 ENCOUNTER — APPOINTMENT (OUTPATIENT)
Dept: PRIMARY CARE | Facility: CLINIC | Age: 39
End: 2024-08-01
Payer: MEDICARE

## 2024-08-09 ENCOUNTER — TELEPHONE (OUTPATIENT)
Dept: PEDIATRIC CARDIOLOGY | Facility: HOSPITAL | Age: 39
End: 2024-08-09
Payer: MEDICARE

## 2024-08-09 NOTE — TELEPHONE ENCOUNTER
08/09/24 at 8:36 AM     Received email from patient with BP measurement image. See Media tab.  Dr. Rebeca ray    - Erin Roldan, RN  [facility] RN Patient Navigator  Pediatric Cardiology  [phone]

## 2024-09-11 DIAGNOSIS — F41.9 ANXIETY: ICD-10-CM

## 2024-09-11 RX ORDER — METHYLPHENIDATE HYDROCHLORIDE 18 MG/1
18 TABLET ORAL EVERY MORNING
Qty: 90 TABLET | Refills: 0 | Status: SHIPPED | OUTPATIENT
Start: 2024-09-11

## 2024-09-23 ENCOUNTER — OFFICE VISIT (OUTPATIENT)
Dept: PRIMARY CARE | Facility: CLINIC | Age: 39
End: 2024-09-23
Payer: MEDICARE

## 2024-09-23 VITALS
HEART RATE: 76 BPM | DIASTOLIC BLOOD PRESSURE: 80 MMHG | SYSTOLIC BLOOD PRESSURE: 128 MMHG | OXYGEN SATURATION: 97 % | RESPIRATION RATE: 16 BRPM | BODY MASS INDEX: 23.37 KG/M2 | WEIGHT: 127 LBS | HEIGHT: 62 IN

## 2024-09-23 DIAGNOSIS — F41.9 ANXIETY: ICD-10-CM

## 2024-09-23 DIAGNOSIS — F90.0 ATTENTION DEFICIT HYPERACTIVITY DISORDER (ADHD), PREDOMINANTLY INATTENTIVE TYPE: ICD-10-CM

## 2024-09-23 DIAGNOSIS — Z12.31 SCREENING MAMMOGRAM FOR BREAST CANCER: ICD-10-CM

## 2024-09-23 DIAGNOSIS — Z00.00 ROUTINE GENERAL MEDICAL EXAMINATION AT A HEALTH CARE FACILITY: Primary | ICD-10-CM

## 2024-09-23 DIAGNOSIS — M41.35 THORACOGENIC SCOLIOSIS OF THORACOLUMBAR REGION: ICD-10-CM

## 2024-09-23 PROCEDURE — 3079F DIAST BP 80-89 MM HG: CPT | Performed by: INTERNAL MEDICINE

## 2024-09-23 PROCEDURE — 3074F SYST BP LT 130 MM HG: CPT | Performed by: INTERNAL MEDICINE

## 2024-09-23 PROCEDURE — 99395 PREV VISIT EST AGE 18-39: CPT | Performed by: INTERNAL MEDICINE

## 2024-09-23 PROCEDURE — 3008F BODY MASS INDEX DOCD: CPT | Performed by: INTERNAL MEDICINE

## 2024-09-23 ASSESSMENT — ENCOUNTER SYMPTOMS
WEAKNESS: 0
COUGH: 0
SINUS PAIN: 0
FREQUENCY: 0
HEADACHES: 1
NERVOUS/ANXIOUS: 1
HEMATURIA: 0
NAUSEA: 0
JOINT SWELLING: 0
PALPITATIONS: 0
ARTHRALGIAS: 0
FEVER: 0
CONSTIPATION: 0
LOSS OF SENSATION IN FEET: 0
CHILLS: 0
FATIGUE: 0
SORE THROAT: 0
DIZZINESS: 0
DIFFICULTY URINATING: 0
VOMITING: 0
DIARRHEA: 0
ABDOMINAL PAIN: 0
BACK PAIN: 1
DEPRESSION: 0
RHINORRHEA: 0
SHORTNESS OF BREATH: 0
OCCASIONAL FEELINGS OF UNSTEADINESS: 0
SLEEP DISTURBANCE: 0
DYSURIA: 0
APPETITE CHANGE: 0

## 2024-09-23 ASSESSMENT — PATIENT HEALTH QUESTIONNAIRE - PHQ9
1. LITTLE INTEREST OR PLEASURE IN DOING THINGS: NOT AT ALL
SUM OF ALL RESPONSES TO PHQ9 QUESTIONS 1 AND 2: 0
2. FEELING DOWN, DEPRESSED OR HOPELESS: NOT AT ALL

## 2024-09-23 ASSESSMENT — PAIN SCALES - GENERAL: PAINLEVEL: 0-NO PAIN

## 2024-09-23 NOTE — PROGRESS NOTES
"Subjective   Patient ID: Angelica Matson is a 39 y.o. female who presents for Annual Exam.    Patient has been under some recent life stress factors. Follows a vegan diet. Sees a therapist. Reports having a diagnosis of scoliosis and sees two different chiropractors to monitor the condition. Is up to date on TDAP vaccine. Doesn't want COVID or flu vaccines.     Diagnostics Reviewed:  Labs Reviewed: 7/31/2023 Lipid Panel triglycerides 170.            Review of Systems   Constitutional:  Negative for appetite change, chills, fatigue and fever.   HENT:  Negative for ear pain, rhinorrhea, sinus pain and sore throat.    Eyes:  Negative for visual disturbance.   Respiratory:  Negative for cough and shortness of breath.    Cardiovascular:  Negative for chest pain and palpitations.   Gastrointestinal:  Negative for abdominal pain, constipation, diarrhea, nausea and vomiting.   Genitourinary:  Negative for difficulty urinating, dysuria, frequency and hematuria.   Musculoskeletal:  Positive for back pain. Negative for arthralgias and joint swelling.   Skin:  Negative for rash.   Neurological:  Positive for headaches. Negative for dizziness and weakness.   Psychiatric/Behavioral:  Negative for sleep disturbance. The patient is nervous/anxious.        Objective   /80   Pulse 76   Resp 16   Ht 1.575 m (5' 2\")   Wt 57.6 kg (127 lb)   LMP  (LMP Unknown)   SpO2 97%   BMI 23.23 kg/m²     Physical Exam  HENT:      Right Ear: Tympanic membrane normal.      Left Ear: Tympanic membrane normal.      Mouth/Throat:      Pharynx: Oropharynx is clear. No oropharyngeal exudate.   Eyes:      Conjunctiva/sclera: Conjunctivae normal.      Pupils: Pupils are equal, round, and reactive to light.   Neck:      Thyroid: No thyromegaly.      Vascular: No carotid bruit.   Cardiovascular:      Rate and Rhythm: Regular rhythm.      Heart sounds: Normal heart sounds.   Pulmonary:      Breath sounds: Normal breath sounds.   Abdominal:      " Palpations: Abdomen is soft. There is no hepatomegaly.      Tenderness: There is no abdominal tenderness.   Musculoskeletal:      Right lower leg: No edema.      Left lower leg: No edema.   Lymphadenopathy:      Cervical: No cervical adenopathy.   Skin:     General: Skin is warm.      Comments: No suspicious moles   Neurological:      Mental Status: She is alert and oriented to person, place, and time.      Gait: Gait is intact.   Psychiatric:         Mood and Affect: Mood and affect normal.         Behavior: Behavior normal. Behavior is cooperative.         Cognition and Memory: Cognition normal.         Assessment/Plan   Diagnoses and all orders for this visit:  Routine general medical examination at a health care facility  Screening mammogram for breast cancer  -     BI mammo bilateral screening tomosynthesis  Attention deficit hyperactivity disorder (ADHD), predominantly inattentive type  Anxiety  Thoracogenic scoliosis of thoracolumbar region      Scribe Attestation  By signing my name below, Aki WILLIAMSON, Scribe   attest that this documentation has been prepared under the direction and in the presence of Jaylin Monsivais MD.

## 2024-10-29 ENCOUNTER — APPOINTMENT (OUTPATIENT)
Dept: RADIOLOGY | Facility: HOSPITAL | Age: 39
End: 2024-10-29
Payer: MEDICARE

## 2024-12-26 ENCOUNTER — LAB (OUTPATIENT)
Dept: LAB | Facility: LAB | Age: 39
End: 2024-12-26
Payer: MEDICARE

## 2024-12-26 DIAGNOSIS — Z79.899 ENCOUNTER FOR LONG-TERM (CURRENT) USE OF MEDICATIONS: ICD-10-CM

## 2024-12-26 PROCEDURE — 80307 DRUG TEST PRSMV CHEM ANLYZR: CPT

## 2024-12-27 LAB
AMPHETAMINES UR QL SCN: NORMAL
BARBITURATES UR QL SCN: NORMAL
BENZODIAZ UR QL SCN: NORMAL
BZE UR QL SCN: NORMAL
CANNABINOIDS UR QL SCN: NORMAL
FENTANYL+NORFENTANYL UR QL SCN: NORMAL
METHADONE UR QL SCN: NORMAL
OPIATES UR QL SCN: NORMAL
OXYCODONE+OXYMORPHONE UR QL SCN: NORMAL
PCP UR QL SCN: NORMAL

## 2024-12-30 DIAGNOSIS — F41.9 ANXIETY: ICD-10-CM

## 2024-12-30 RX ORDER — METHYLPHENIDATE HYDROCHLORIDE 18 MG/1
18 TABLET ORAL EVERY MORNING
Qty: 90 TABLET | Refills: 0 | Status: SHIPPED | OUTPATIENT
Start: 2024-12-30

## 2025-02-11 DIAGNOSIS — F41.9 ANXIETY: ICD-10-CM

## 2025-02-11 RX ORDER — ESCITALOPRAM OXALATE 20 MG/1
20 TABLET ORAL DAILY
Qty: 90 TABLET | Refills: 3 | Status: SHIPPED | OUTPATIENT
Start: 2025-02-11

## 2025-03-27 DIAGNOSIS — F41.9 ANXIETY: ICD-10-CM

## 2025-03-28 RX ORDER — METHYLPHENIDATE HYDROCHLORIDE 18 MG/1
18 TABLET ORAL EVERY MORNING
Qty: 90 TABLET | Refills: 0 | Status: SHIPPED | OUTPATIENT
Start: 2025-03-28

## 2025-05-01 ENCOUNTER — APPOINTMENT (OUTPATIENT)
Dept: PRIMARY CARE | Facility: CLINIC | Age: 40
End: 2025-05-01
Payer: MEDICARE

## 2025-05-08 DIAGNOSIS — R11.0 NAUSEA: ICD-10-CM

## 2025-05-09 RX ORDER — ONDANSETRON 4 MG/1
4 TABLET, FILM COATED ORAL EVERY 6 HOURS PRN
Qty: 120 TABLET | Refills: 0 | Status: SHIPPED | OUTPATIENT
Start: 2025-05-09

## 2025-05-28 ENCOUNTER — APPOINTMENT (OUTPATIENT)
Dept: PRIMARY CARE | Facility: CLINIC | Age: 40
End: 2025-05-28
Payer: MEDICARE

## 2025-06-04 ENCOUNTER — APPOINTMENT (OUTPATIENT)
Dept: PEDIATRIC CARDIOLOGY | Facility: HOSPITAL | Age: 40
End: 2025-06-04
Payer: MEDICARE

## 2025-06-17 DIAGNOSIS — Q20.3 TRANSPOSITION OF GREAT ARTERIES (HHS-HCC): ICD-10-CM

## 2025-06-17 DIAGNOSIS — Z87.74 H/O SENNING PROCEDURE: ICD-10-CM

## 2025-06-19 ENCOUNTER — APPOINTMENT (OUTPATIENT)
Dept: PRIMARY CARE | Facility: CLINIC | Age: 40
End: 2025-06-19
Payer: MEDICARE

## 2025-06-19 VITALS
HEIGHT: 62 IN | DIASTOLIC BLOOD PRESSURE: 78 MMHG | WEIGHT: 123 LBS | RESPIRATION RATE: 16 BRPM | BODY MASS INDEX: 22.63 KG/M2 | HEART RATE: 76 BPM | OXYGEN SATURATION: 98 % | SYSTOLIC BLOOD PRESSURE: 124 MMHG

## 2025-06-19 DIAGNOSIS — M54.16 LUMBAR RADICULOPATHY: ICD-10-CM

## 2025-06-19 DIAGNOSIS — Q20.3 TRANSPOSITION OF GREAT ARTERIES (HHS-HCC): ICD-10-CM

## 2025-06-19 DIAGNOSIS — Z78.9 VEGAN: ICD-10-CM

## 2025-06-19 DIAGNOSIS — Q23.1: ICD-10-CM

## 2025-06-19 DIAGNOSIS — Z13.6 SCREENING FOR CARDIOVASCULAR CONDITION: ICD-10-CM

## 2025-06-19 DIAGNOSIS — S76.311A STRAIN OF RIGHT HAMSTRING MUSCLE, INITIAL ENCOUNTER: Primary | ICD-10-CM

## 2025-06-19 DIAGNOSIS — Z12.31 SCREENING MAMMOGRAM FOR BREAST CANCER: ICD-10-CM

## 2025-06-19 PROCEDURE — 3074F SYST BP LT 130 MM HG: CPT | Performed by: INTERNAL MEDICINE

## 2025-06-19 PROCEDURE — 99214 OFFICE O/P EST MOD 30 MIN: CPT | Performed by: INTERNAL MEDICINE

## 2025-06-19 PROCEDURE — 3078F DIAST BP <80 MM HG: CPT | Performed by: INTERNAL MEDICINE

## 2025-06-19 PROCEDURE — 3008F BODY MASS INDEX DOCD: CPT | Performed by: INTERNAL MEDICINE

## 2025-06-19 RX ORDER — ESCITALOPRAM OXALATE 10 MG/1
1 TABLET ORAL
COMMUNITY
Start: 2025-05-08

## 2025-06-19 RX ORDER — GABAPENTIN 100 MG/1
100 CAPSULE ORAL 3 TIMES DAILY
Qty: 90 CAPSULE | Refills: 11 | Status: SHIPPED | OUTPATIENT
Start: 2025-06-19 | End: 2025-09-17

## 2025-06-19 ASSESSMENT — ENCOUNTER SYMPTOMS
ARTHRALGIAS: 0
DEPRESSION: 0
DIZZINESS: 0
SHORTNESS OF BREATH: 0
CONSTIPATION: 0
ABDOMINAL PAIN: 0
COUGH: 0
NAUSEA: 0
LOSS OF SENSATION IN FEET: 0
OCCASIONAL FEELINGS OF UNSTEADINESS: 0
DIARRHEA: 0
PALPITATIONS: 0

## 2025-06-19 ASSESSMENT — PATIENT HEALTH QUESTIONNAIRE - PHQ9
2. FEELING DOWN, DEPRESSED OR HOPELESS: NOT AT ALL
SUM OF ALL RESPONSES TO PHQ9 QUESTIONS 1 AND 2: 0
1. LITTLE INTEREST OR PLEASURE IN DOING THINGS: NOT AT ALL

## 2025-06-19 ASSESSMENT — PAIN SCALES - GENERAL: PAINLEVEL_OUTOF10: 0-NO PAIN

## 2025-06-30 DIAGNOSIS — F41.9 ANXIETY: ICD-10-CM

## 2025-06-30 RX ORDER — METHYLPHENIDATE HYDROCHLORIDE 18 MG/1
18 TABLET ORAL EVERY MORNING
Qty: 90 TABLET | Refills: 0 | Status: SHIPPED | OUTPATIENT
Start: 2025-06-30

## 2025-07-11 ENCOUNTER — APPOINTMENT (OUTPATIENT)
Dept: RADIOLOGY | Facility: CLINIC | Age: 40
End: 2025-07-11
Payer: MEDICARE

## 2025-07-15 ENCOUNTER — APPOINTMENT (OUTPATIENT)
Dept: RADIOLOGY | Facility: CLINIC | Age: 40
End: 2025-07-15
Payer: MEDICARE

## 2025-07-25 ENCOUNTER — APPOINTMENT (OUTPATIENT)
Dept: RADIOLOGY | Facility: CLINIC | Age: 40
End: 2025-07-25
Payer: MEDICARE

## 2025-08-04 ENCOUNTER — APPOINTMENT (OUTPATIENT)
Dept: RADIOLOGY | Facility: CLINIC | Age: 40
End: 2025-08-04
Payer: MEDICARE

## 2025-08-19 ENCOUNTER — APPOINTMENT (OUTPATIENT)
Dept: RADIOLOGY | Facility: CLINIC | Age: 40
End: 2025-08-19
Payer: MEDICARE

## 2025-08-19 VITALS — BODY MASS INDEX: 22.63 KG/M2 | HEIGHT: 62 IN | WEIGHT: 123 LBS

## 2025-08-19 PROCEDURE — 77063 BREAST TOMOSYNTHESIS BI: CPT | Performed by: GENERAL PRACTICE

## 2025-08-19 PROCEDURE — 77067 SCR MAMMO BI INCL CAD: CPT | Performed by: GENERAL PRACTICE

## 2025-08-19 PROCEDURE — 77067 SCR MAMMO BI INCL CAD: CPT

## 2025-08-20 DIAGNOSIS — F41.9 ANXIETY: ICD-10-CM

## 2025-08-21 ENCOUNTER — APPOINTMENT (OUTPATIENT)
Dept: PEDIATRIC CARDIOLOGY | Facility: HOSPITAL | Age: 40
End: 2025-08-21
Payer: MEDICARE

## 2025-08-21 RX ORDER — BUPROPION HYDROCHLORIDE 150 MG/1
150 TABLET ORAL
Qty: 90 TABLET | Refills: 0 | Status: SHIPPED | OUTPATIENT
Start: 2025-08-21